# Patient Record
Sex: FEMALE | Race: WHITE | NOT HISPANIC OR LATINO | Employment: STUDENT | URBAN - METROPOLITAN AREA
[De-identification: names, ages, dates, MRNs, and addresses within clinical notes are randomized per-mention and may not be internally consistent; named-entity substitution may affect disease eponyms.]

---
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
## 2025-03-27 ENCOUNTER — HOSPITAL ENCOUNTER (EMERGENCY)
Facility: HOSPITAL | Age: 20
Discharge: HOME | End: 2025-03-27
Attending: STUDENT IN AN ORGANIZED HEALTH CARE EDUCATION/TRAINING PROGRAM
Payer: COMMERCIAL

## 2025-03-27 DIAGNOSIS — M79.672 LEFT FOOT PAIN: ICD-10-CM

## 2025-03-27 DIAGNOSIS — S90.32XA CONTUSION OF LEFT FOOT, INITIAL ENCOUNTER: Primary | ICD-10-CM

## 2025-03-27 DIAGNOSIS — T14.90XA INJURY: ICD-10-CM

## 2025-03-27 LAB
B-HCG UR QL: NEGATIVE
CTP QC/QA: YES

## 2025-03-27 PROCEDURE — 81025 URINE PREGNANCY TEST: CPT | Mod: ER

## 2025-03-27 PROCEDURE — 99283 EMERGENCY DEPT VISIT LOW MDM: CPT | Mod: 25,ER

## 2025-03-27 PROCEDURE — 25000003 PHARM REV CODE 250: Mod: ER | Performed by: STUDENT IN AN ORGANIZED HEALTH CARE EDUCATION/TRAINING PROGRAM

## 2025-03-27 PROCEDURE — 81025 URINE PREGNANCY TEST: CPT | Mod: ER | Performed by: STUDENT IN AN ORGANIZED HEALTH CARE EDUCATION/TRAINING PROGRAM

## 2025-03-27 RX ORDER — METHOCARBAMOL 500 MG/1
500 TABLET, FILM COATED ORAL 3 TIMES DAILY
Qty: 30 TABLET | Refills: 0 | Status: SHIPPED | OUTPATIENT
Start: 2025-03-27 | End: 2025-04-06

## 2025-03-27 RX ORDER — ACETAMINOPHEN 500 MG
1000 TABLET ORAL
Status: COMPLETED | OUTPATIENT
Start: 2025-03-27 | End: 2025-03-27

## 2025-03-27 RX ORDER — IBUPROFEN 600 MG/1
600 TABLET ORAL
Status: COMPLETED | OUTPATIENT
Start: 2025-03-27 | End: 2025-03-27

## 2025-03-27 RX ORDER — ACETAMINOPHEN 500 MG
500 TABLET ORAL EVERY 6 HOURS PRN
Qty: 20 TABLET | Refills: 0 | Status: SHIPPED | OUTPATIENT
Start: 2025-03-27

## 2025-03-27 RX ORDER — IBUPROFEN 600 MG/1
600 TABLET ORAL EVERY 6 HOURS PRN
Qty: 20 TABLET | Refills: 0 | Status: SHIPPED | OUTPATIENT
Start: 2025-03-27

## 2025-03-27 RX ORDER — METHOCARBAMOL 500 MG/1
1000 TABLET, FILM COATED ORAL
Status: COMPLETED | OUTPATIENT
Start: 2025-03-27 | End: 2025-03-27

## 2025-03-27 RX ADMIN — METHOCARBAMOL 1000 MG: 500 TABLET ORAL at 09:03

## 2025-03-27 RX ADMIN — ACETAMINOPHEN 1000 MG: 500 TABLET ORAL at 09:03

## 2025-03-27 RX ADMIN — IBUPROFEN 600 MG: 600 TABLET ORAL at 09:03

## 2025-03-28 VITALS
RESPIRATION RATE: 16 BRPM | OXYGEN SATURATION: 98 % | BODY MASS INDEX: 31.39 KG/M2 | DIASTOLIC BLOOD PRESSURE: 72 MMHG | TEMPERATURE: 99 F | HEART RATE: 79 BPM | WEIGHT: 200 LBS | HEIGHT: 67 IN | SYSTOLIC BLOOD PRESSURE: 116 MMHG

## 2025-03-28 NOTE — DISCHARGE INSTRUCTIONS
Thank you for coming to our Emergency Department today. It is important to remember that some problems are difficult to diagnose and may not be found during your first visit. Be sure to follow up with your primary care doctor and review any labs/imaging that was performed with them. If you do not have a primary care doctor, you may contact the one listed on your discharge paperwork or you may also call the [facility] Appointment Desk at [phone] to schedule an appointment with one.     All medications may potentially have side effects and it is impossible to predict which medications may give you side effects. If you feel that you are having a negative effect of any medication you should immediately stop taking them and seek medical attention.    Return to the ER with any questions/concerns, new/concerning symptoms, worsening or failure to improve. Do not drive or make any important decisions for 24 hours if you have received any pain medications, sedatives or mood altering drugs during your ER visit.

## 2025-03-28 NOTE — ED PROVIDER NOTES
Encounter Date: 3/27/2025       History     Chief Complaint   Patient presents with    Foot Injury     Dropped a bucket on her L foot last Saturday; c/o pain/ swelling in L foot/ankle     19 y.o. female who has a past medical history of Depression and Seasonal asthma. presents to the emergency department due to  left ankle and foot pain after dropping a 5 gal bucket in the region 5 days ago.  Patient reports since the injury she has been able to ambulate but has been having persistent swelling and pain.  He does endorse having to stand up for long period of time at work and furthermore during the weekend it was her sister's wedding so she has been dancing and on her feet for prolonged period of time.  He has been attempting to take Tylenol and ibuprofen for pain control.  Given persistence her pain came to the emergency department for evaluation.    The history is provided by the patient.     Review of patient's allergies indicates:  No Known Allergies  Past Medical History:   Diagnosis Date    Depression     Seasonal asthma      No past surgical history on file.  No family history on file.  Social History[1]  Review of Systems   Constitutional:  Negative for fever.   HENT:  Negative for sore throat.    Respiratory:  Negative for shortness of breath.    Cardiovascular:  Negative for chest pain.   Gastrointestinal:  Negative for nausea.   Genitourinary:  Negative for dysuria.   Musculoskeletal:  Positive for arthralgias. Negative for back pain.   Skin:  Negative for rash.   Neurological:  Negative for weakness.   Hematological:  Does not bruise/bleed easily.       Physical Exam     Initial Vitals [03/27/25 1934]   BP Pulse Resp Temp SpO2   131/86 85 18 98.5 °F (36.9 °C) 98 %      MAP       --         Physical Exam    Constitutional: She is not diaphoretic. No distress.   Eyes: EOM are normal.   Pulmonary/Chest: No respiratory distress.   Musculoskeletal:      Left ankle: Tenderness present over the lateral malleolus  and medial malleolus.      Left foot: Tenderness present.     Neurological: She is alert and oriented to person, place, and time.   Skin: Skin is warm.         ED Course   Procedures  Labs Reviewed   POCT URINE PREGNANCY       Result Value    POC Preg Test, Ur Negative       Acceptable Yes            Imaging Results              X-Ray Foot Complete Left (Final result)  Result time 03/27/25 21:28:48      Final result by Reta Swift MD (03/27/25 21:28:48)                   Impression:      No acute bony abnormality detected.      Electronically signed by: Reta Swift  Date:    03/27/2025  Time:    21:28               Narrative:    EXAMINATION:  THREE VIEWS OF THE LEFT ANKLE AND LEFT FOOT    CLINICAL HISTORY:  Pain in left foot; Injury, unspecified, initial encounter    TECHNIQUE:  AP, lateral and oblique views of the left ankle and left foot    COMPARISON:  None.    FINDINGS:  Three views of the left ankle demonstrate no acute fracture or dislocation.    Three views of the left foot demonstrate no acute fracture or dislocation.  A sclerotic focus is seen at the middle phalanx of the 5th digit.                                       X-Ray Ankle Complete Left (Final result)  Result time 03/27/25 21:28:48      Final result by Reta Swift MD (03/27/25 21:28:48)                   Impression:      No acute bony abnormality detected.      Electronically signed by: Reta Swift  Date:    03/27/2025  Time:    21:28               Narrative:    EXAMINATION:  THREE VIEWS OF THE LEFT ANKLE AND LEFT FOOT    CLINICAL HISTORY:  Pain in left foot; Injury, unspecified, initial encounter    TECHNIQUE:  AP, lateral and oblique views of the left ankle and left foot    COMPARISON:  None.    FINDINGS:  Three views of the left ankle demonstrate no acute fracture or dislocation.    Three views of the left foot demonstrate no acute fracture or dislocation.  A sclerotic focus is seen at the middle phalanx  of the 5th digit.                                       Medications   ibuprofen tablet 600 mg (600 mg Oral Given 3/27/25 2120)   acetaminophen tablet 1,000 mg (1,000 mg Oral Given 3/27/25 2120)   methocarbamoL tablet 1,000 mg (1,000 mg Oral Given 3/27/25 2120)     Medical Decision Making:   Initial Assessment:   19 y.o. female who has a past medical history of Depression and Seasonal asthma. presents to the emergency department due to  left ankle and foot pain after dropping a 5 gal bucket in the region 5 days ago. After complete evaluation, including thorough history and physical exam, the patient's symptoms are most consistent with ankle and foot contusion. Imaging with x-ray revealed no evidence of fracture, dislocation, or subluxation. An underlying ligamentous injury is unable to be excluded at this time, and additional imaging may be necessary in the future if the patient's symptoms do not improve. The  joint is currently stable and additional imaging is not indicated at this time. The patient will be treated with supportive care . R.I.C.E. instructions were provided.    Differential Diagnosis:   Differential Diagnosis includes, but is not limited to:  Fracture, dislocation, compartment syndrome, nerve injury/palsy, vascular injury, DVT, rhabdomyolysis, hemarthrosis, septic joint, cellulitis, bursitis, muscle strain, ligament tear/sprain, laceration, foreign body, abrasion, soft tissue contusion, osteoarthritis.      Clinical Tests:   Radiological Study: Ordered and Reviewed             ED Course as of 03/27/25 2144   Thu Mar 27, 2025   2135 X-Ray Foot Complete Left [AS]   2135 X-Ray Ankle Complete Left [AS]   2135 Pt is currently stable for discharge. I see no indication of an emergent process beyond that addressed during our encounter but have duly counseled the patient/family regarding the need for prompt follow-up as well as the indications that should prompt immediate return to the emergency room should  new or worrisome developments occur. I discussed the ED work up and diagnostic findings with the patient/family. The patient/family has been provided with verbal and printed direction regarding our final diagnosis(es) as well as instructions regarding use of OTC and/or Rx medications intended to manage the patient's aforementioned conditions. The patient/family verbalized an understanding. The patient/family is asked if there are any questions or concerns. We discuss the case, until all issues are addressed to the patient/family's satisfaction. Patient/family understands and is agreeable to the plan.  [AS]      ED Course User Index  [AS] Etienne Valdivia MD          Medical Decision Making  Amount and/or Complexity of Data Reviewed  Labs: ordered.  Radiology: ordered. Decision-making details documented in ED Course.    Risk  OTC drugs.  Prescription drug management.           Clinical Impression:   Final diagnoses:  [T14.90XA] Injury  [M79.672] Left foot pain  [S90.32XA] Contusion of left foot, initial encounter (Primary)          ED Disposition Condition    Discharge Stable          ED Prescriptions       Medication Sig Dispense Start Date End Date Auth. Provider    ibuprofen (ADVIL,MOTRIN) 600 MG tablet Take 1 tablet (600 mg total) by mouth every 6 (six) hours as needed for Pain. 20 tablet 3/27/2025 -- Etienne Valdivia MD    acetaminophen (TYLENOL) 500 MG tablet Take 1 tablet (500 mg total) by mouth every 6 (six) hours as needed for Pain. 20 tablet 3/27/2025 -- Etienne Valdivia MD    methocarbamoL (ROBAXIN) 500 MG Tab Take 1 tablet (500 mg total) by mouth 3 (three) times daily. for 10 days 30 tablet 3/27/2025 4/6/2025 Etienne Valdivia MD          Follow-up Information       Follow up With Specialties Details Why Contact Info    Lisa Biggs MD Pediatrics Schedule an appointment as soon as possible for a visit  for reassesment [address] [place] [id number]  [phone]      [facility]  Emergency Medicine  If symptoms worsen [address]  [facility] [id number] [phone]            DISCLAIMER: This note was prepared with [employer] voice recognition transcription software. Garbled syntax, mangled pronouns, and other bizarre constructions may be attributed to that software system.         [1]   Social History  Tobacco Use    Smoking status: Never   Substance Use Topics    Alcohol use: Never    Drug use: Never        Etienne Valdivia MD  03/27/25 [id number]